# Patient Record
Sex: MALE | Race: WHITE | HISPANIC OR LATINO | Employment: UNEMPLOYED | URBAN - NONMETROPOLITAN AREA
[De-identification: names, ages, dates, MRNs, and addresses within clinical notes are randomized per-mention and may not be internally consistent; named-entity substitution may affect disease eponyms.]

---

## 2022-09-06 ENCOUNTER — TELEMEDICINE (OUTPATIENT)
Dept: MEDICAL GROUP | Facility: PHYSICIAN GROUP | Age: 67
End: 2022-09-06
Payer: COMMERCIAL

## 2022-09-06 VITALS
DIASTOLIC BLOOD PRESSURE: 74 MMHG | SYSTOLIC BLOOD PRESSURE: 117 MMHG | HEIGHT: 69 IN | TEMPERATURE: 97.5 F | WEIGHT: 145 LBS | BODY MASS INDEX: 21.48 KG/M2

## 2022-09-06 DIAGNOSIS — D45 POLYCYTHEMIA VERA (HCC): ICD-10-CM

## 2022-09-06 DIAGNOSIS — K82.4 GALLBLADDER POLYP: ICD-10-CM

## 2022-09-06 DIAGNOSIS — Z12.11 SCREENING FOR COLON CANCER: ICD-10-CM

## 2022-09-06 DIAGNOSIS — U07.1 COVID-19: ICD-10-CM

## 2022-09-06 PROCEDURE — 99204 OFFICE O/P NEW MOD 45 MIN: CPT | Mod: 95 | Performed by: PHYSICIAN ASSISTANT

## 2022-09-06 RX ORDER — CALCIUM POLYCARBOPHIL 625 MG
1250 TABLET ORAL
COMMUNITY

## 2022-09-06 RX ORDER — HYDROXYUREA 500 MG/1
CAPSULE ORAL
COMMUNITY
Start: 2022-07-15

## 2022-09-06 ASSESSMENT — PATIENT HEALTH QUESTIONNAIRE - PHQ9: CLINICAL INTERPRETATION OF PHQ2 SCORE: 0

## 2022-09-06 NOTE — PROGRESS NOTES
Telemedicine Visit: Established Patient     This encounter was conducted via Zoom using encrypted video.  Verbal consent was obtained. Patient's identity was verified. The patient was in private location in [place].     CC:  Chief Complaint   Patient presents with    Establish Care     POS for COVID       HISTORY OF PRESENT ILLNESS: Patient is a 67 y.o. male established patient presenting to establish PCP  Pt tested positive for COVID today. Has congestion, on/off low grade fever, coughing. Has a little SOB. Taking mucinex. Has albuterol inhaler too.   Pt has polycythemia vera. Sees Dr Horn at [facility]. Takes hydroxyurea for this. Abdominal US done on 7/18/22 indicating splenomegaly.   Pt gets pretty low BP and getting occasional dizziness.   Pt gets itchiness that has been present his whole life. Uses coal tar. No improvement with topical steroids.   Pt has gallbladder polyp at 9mm size. Recommended in US report to repeat imaging in 6 months.     No problem-specific Assessment & Plan notes found for this encounter.      There are no problems to display for this patient.     Allergies:Codeine, Hydrocodone-acetaminophen, and Vicodin [hydrocodone-acetaminophen]    Current Outpatient Medications   Medication Sig Dispense Refill    Aspirin 81 MG Cap       MULTIPLE VITAMIN PO Take  by mouth.      Calcium Polycarbophil (FIBER) 625 MG Tab Take 1,250 mg by mouth.      hydroxyurea (HYDREA) 500 MG Cap       Omega-3 Fatty Acids (FISH OIL) 1200 MG CAPSULE DELAYED RELEASE Take  by mouth.      Pseudoephedrine-DM-GG-APAP (COLD/COUGH MULTI-SYMPTOM PO) Take  by mouth.      Nirmatrelvir&Ritonavir 300/100 20 x 150 MG & 10 x 100MG Tablet Therapy Pack Take 300 mg nirmatrelvir (two 150 mg tablets) with 100 mg ritonavir (one 100 mg tablet) by mouth, with all three tablets taken together twice daily for 5 days. 30 Each 0     No current facility-administered medications for this visit.       Social History     Tobacco Use    Smoking status:  "Never    Smokeless tobacco: Never   Vaping Use    Vaping Use: Never used   Substance Use Topics    Alcohol use: Never    Drug use: Never     Social History     Social History Narrative    Not on file       Family History   Problem Relation Age of Onset    Breast Cancer Mother         ROS:     - Constitutional:  Negative for fever, chills, unexpected weight change, and fatigue/generalized weakness.    - HEENT:  Negative for headaches, vision changes, hearing changes, ear pain, ear discharge, rhinorrhea, sinus congestion, sore throat, and neck pain.      - Respiratory: Negative for cough, sputum production, chest congestion, dyspnea, wheezing, and crackles.      - Cardiovascular: Negative for chest pain, palpitations, orthopnea, and bilateral lower extremity edema.     - Gastrointestinal: Negative for heartburn, nausea, vomiting, abdominal pain, hematochezia, melena, diarrhea, constipation, and greasy/foul-smelling stools.     - Genitourinary: Negative for dysuria, polyuria, hematuria, pyuria, urinary urgency, and urinary incontinence.     - Musculoskeletal: Negative for myalgias, back pain, and joint pain.     - Skin:Positive for itchiness.  Negative for rash, cyanotic skin color change.     - Neurological:Positive for intermittent numbness.  Negative for dizziness, tingling, tremors, focal weakness and headaches.     - Endo/Heme/Allergies: Does not bruise/bleed easily.     - Psychiatric/Behavioral: Negative for depression, suicidal/homicidal ideation and memory loss.              Exam:    /74   Temp 36.4 °C (97.5 °F) (Temporal)   Ht 1.753 m (5' 9\")   Wt 65.8 kg (145 lb)  Body mass index is 21.41 kg/m².    General:  Well nourished, well developed male in NAD  Head is grossly normal.  Neck: Supple.   Pulmonary: No accessory muscle use  Skin: No apparent lesions  Neuro: Alert and oriented  Psych: normal mood and affect    Please note that this dictation was created using voice recognition software. I have made " every reasonable attempt to correct obvious errors, but I expect that there are errors of grammar and possibly content that I did not discover before finalizing the note.    LABS: 9/6/22: Results reviewed and discussed with the patient, questions answered.    Assessment/Plan:  1. COVID-19  Will treat with Paxlovid. Return to office PRN  - Nirmatrelvir&Ritonavir 300/100 20 x 150 MG & 10 x 100MG Tablet Therapy Pack; Take 300 mg nirmatrelvir (two 150 mg tablets) with 100 mg ritonavir (one 100 mg tablet) by mouth, with all three tablets taken together twice daily for 5 days.  Dispense: 30 Each; Refill: 0    2. Screening for colon cancer  Order placed.   - COLOGUARD (FIT DNA)    3. Polycythemia vera (HCC)  F/u with Dr Horn    4. Gallbladder polyp  Will get repeat US in 4-5 months based on 9mm size  - US-RUQ; Future

## 2022-10-25 ENCOUNTER — HOSPITAL ENCOUNTER (OUTPATIENT)
Facility: MEDICAL CENTER | Age: 67
End: 2022-10-25
Attending: INTERNAL MEDICINE
Payer: COMMERCIAL

## 2022-10-25 PROCEDURE — 80053 COMPREHEN METABOLIC PANEL: CPT

## 2022-10-26 LAB
ALBUMIN SERPL BCP-MCNC: 4.3 G/DL (ref 3.2–4.9)
ALBUMIN/GLOB SERPL: 2.3 G/DL
ALP SERPL-CCNC: 80 U/L (ref 30–99)
ALT SERPL-CCNC: 13 U/L (ref 2–50)
ANION GAP SERPL CALC-SCNC: 13 MMOL/L (ref 7–16)
AST SERPL-CCNC: 17 U/L (ref 12–45)
BILIRUB SERPL-MCNC: 0.7 MG/DL (ref 0.1–1.5)
BUN SERPL-MCNC: 16 MG/DL (ref 8–22)
CALCIUM SERPL-MCNC: 9.1 MG/DL (ref 8.5–10.5)
CHLORIDE SERPL-SCNC: 102 MMOL/L (ref 96–112)
CO2 SERPL-SCNC: 23 MMOL/L (ref 20–33)
CREAT SERPL-MCNC: 0.78 MG/DL (ref 0.5–1.4)
GFR SERPLBLD CREATININE-BSD FMLA CKD-EPI: 99 ML/MIN/1.73 M 2
GLOBULIN SER CALC-MCNC: 1.9 G/DL (ref 1.9–3.5)
GLUCOSE SERPL-MCNC: 94 MG/DL (ref 65–99)
POTASSIUM SERPL-SCNC: 4.3 MMOL/L (ref 3.6–5.5)
PROT SERPL-MCNC: 6.2 G/DL (ref 6–8.2)
SODIUM SERPL-SCNC: 138 MMOL/L (ref 135–145)

## 2023-04-04 ENCOUNTER — TELEMEDICINE (OUTPATIENT)
Dept: MEDICAL GROUP | Facility: PHYSICIAN GROUP | Age: 68
End: 2023-04-04
Payer: COMMERCIAL

## 2023-04-04 VITALS — WEIGHT: 145 LBS | BODY MASS INDEX: 21.48 KG/M2 | HEIGHT: 69 IN

## 2023-04-04 DIAGNOSIS — U07.1 COVID-19: ICD-10-CM

## 2023-04-04 PROCEDURE — 99213 OFFICE O/P EST LOW 20 MIN: CPT | Mod: 95 | Performed by: STUDENT IN AN ORGANIZED HEALTH CARE EDUCATION/TRAINING PROGRAM

## 2023-04-04 ASSESSMENT — PATIENT HEALTH QUESTIONNAIRE - PHQ9: CLINICAL INTERPRETATION OF PHQ2 SCORE: 0

## 2023-04-04 ASSESSMENT — FIBROSIS 4 INDEX: FIB4 SCORE: 0.52

## 2023-04-04 NOTE — ASSESSMENT & PLAN NOTE
3 days of symptoms  Discussed Paxlovid which patient has had before, advised the non-FDA approved medication under emergency use authorization.  Patient agrees and understands and would like to go forward with this treatment.    Advised OTC guidelines  Return to care if worsening or ER if severe    Renal panel 5 months ago within normal limits

## 2023-04-04 NOTE — PROGRESS NOTES
"Virtual Visit: Established Patient   This visit was conducted via Zoom using secure and encrypted videoconferencing technology.   The patient was in their home in the state of [place].    The patient's identity was confirmed and verbal consent was obtained for this virtual visit.    Subjective:   CC:   Chief Complaint   Patient presents with    Coronavirus Screening     POS COVID x2days     Jose Armando Ram is a 67 y.o. male presenting for evaluation and management of:    Problem   Covid-19    Symptoms started: 3 days ago    Having congestion, headaches, dizziness, chills but no fevers, fatigue.   Diarrhea x1, denies nausea or vomiting.     Denies shortness of breath          ROS   hpi    Current medicines (including changes today)  Current Outpatient Medications   Medication Sig Dispense Refill    Nirmatrelvir&Ritonavir 300/100 20 x 150 MG & 10 x 100MG Tablet Therapy Pack Take 300 mg nirmatrelvir (two 150 mg tablets) with 100 mg ritonavir (one 100 mg tablet) by mouth, with all three tablets taken together twice daily for 5 days. 30 Each 0    Aspirin 81 MG Cap       MULTIPLE VITAMIN PO Take  by mouth.      Calcium Polycarbophil (FIBER) 625 MG Tab Take 1,250 mg by mouth.      hydroxyurea (HYDREA) 500 MG Cap       Omega-3 Fatty Acids (FISH OIL) 1200 MG CAPSULE DELAYED RELEASE Take  by mouth.      Pseudoephedrine-DM-GG-APAP (COLD/COUGH MULTI-SYMPTOM PO) Take  by mouth.       No current facility-administered medications for this visit.       Patient Active Problem List    Diagnosis Date Noted    COVID-19 04/04/2023    Polycythemia vera (HCC) 09/06/2022    Gallbladder polyp 09/06/2022        Objective:   Ht 1.753 m (5' 9\")   Wt 65.8 kg (145 lb)   BMI 21.41 kg/m²     Physical Exam:  Constitutional: Alert, no distress, well-groomed.  Skin: No rashes in visible areas.  Eye: Round. Conjunctiva clear, lids normal. No icterus.   ENMT: Lips pink without lesions, good dentition, moist mucous membranes. Phonation " normal.  Neck: No masses, no thyromegaly. Moves freely without pain.  Respiratory: Unlabored respiratory effort, no cough or audible wheeze  Psych: Alert and oriented x3, normal affect and mood.     Assessment and Plan:   The following treatment plan was discussed:     Problem List Items Addressed This Visit       COVID-19     3 days of symptoms  Discussed Paxlovid which patient has had before, advised the non-FDA approved medication under emergency use authorization.  Patient agrees and understands and would like to go forward with this treatment.    Advised OTC guidelines  Return to care if worsening or ER if severe    Renal panel 5 months ago within normal limits           Relevant Medications    Nirmatrelvir&Ritonavir 300/100 20 x 150 MG & 10 x 100MG Tablet Therapy Pack        Follow-up: Return if symptoms worsen or fail to improve.

## 2023-05-22 ENCOUNTER — OFFICE VISIT (OUTPATIENT)
Dept: MEDICAL GROUP | Facility: PHYSICIAN GROUP | Age: 68
End: 2023-05-22
Payer: COMMERCIAL

## 2023-05-22 VITALS
OXYGEN SATURATION: 99 % | HEART RATE: 87 BPM | RESPIRATION RATE: 16 BRPM | TEMPERATURE: 98 F | WEIGHT: 157 LBS | SYSTOLIC BLOOD PRESSURE: 124 MMHG | DIASTOLIC BLOOD PRESSURE: 74 MMHG | BODY MASS INDEX: 23.25 KG/M2 | HEIGHT: 69 IN

## 2023-05-22 DIAGNOSIS — K82.4 GALLBLADDER POLYP: ICD-10-CM

## 2023-05-22 DIAGNOSIS — D45 POLYCYTHEMIA VERA (HCC): ICD-10-CM

## 2023-05-22 DIAGNOSIS — Z00.00 PHYSICAL EXAM, ANNUAL: ICD-10-CM

## 2023-05-22 PROCEDURE — 3074F SYST BP LT 130 MM HG: CPT | Performed by: PHYSICIAN ASSISTANT

## 2023-05-22 PROCEDURE — 99397 PER PM REEVAL EST PAT 65+ YR: CPT | Performed by: PHYSICIAN ASSISTANT

## 2023-05-22 PROCEDURE — 3078F DIAST BP <80 MM HG: CPT | Performed by: PHYSICIAN ASSISTANT

## 2023-05-22 ASSESSMENT — FIBROSIS 4 INDEX: FIB4 SCORE: 0.52

## 2023-05-22 NOTE — PROGRESS NOTES
CC:    Chief Complaint   Patient presents with    Annual Exam       HISTORY OF THE PRESENT ILLNESS:  67 y.o. male presenting for annual physical exam.   Pt continues to see Dr Horn for his P vera.   Pt has not had repeat US for gallbladder polyp.         No problem-specific Assessment & Plan notes found for this encounter.    Allergies: Codeine, Hydrocodone-acetaminophen, and Vicodin [hydrocodone-acetaminophen]    Current Outpatient Medications Ordered in Epic   Medication Sig Dispense Refill    Aspirin 81 MG Cap       MULTIPLE VITAMIN PO Take  by mouth.      Calcium Polycarbophil (FIBER) 625 MG Tab Take 1,250 mg by mouth.      hydroxyurea (HYDREA) 500 MG Cap       Omega-3 Fatty Acids (FISH OIL) 1200 MG CAPSULE DELAYED RELEASE Take  by mouth.       No current Epic-ordered facility-administered medications on file.       History reviewed. No pertinent past medical history.    Past Surgical History:   Procedure Laterality Date    HERNIA REPAIR         Social History     Tobacco Use    Smoking status: Never    Smokeless tobacco: Never   Vaping Use    Vaping Use: Never used   Substance Use Topics    Alcohol use: Not Currently     Comment: occ    Drug use: Never       Social History     Social History Narrative    Not on file       Family History   Problem Relation Age of Onset    Breast Cancer Mother     Cancer Father        ROS:     - Constitutional: Negative for fever, chills, unexpected weight change, and fatigue/generalized weakness.     - HEENT: Negative for headaches, vision changes, hearing changes, ear pain, ear discharge, rhinorrhea, sinus congestion, sore throat, and neck pain.      - Respiratory: Negative for cough, sputum production, chest congestion, dyspnea, wheezing, and crackles.      - Cardiovascular: Negative for chest pain, palpitations, orthopnea, and bilateral lower extremity edema.          Health Maintenance  Cholesterol Screening: Fasting lipid panel  Diabetes Screening: Fasting blood  "sugar  Exercise: Regular exercise.   Substance Abuse: None  Safe in relationship Yes     Cancer screening  Colorectal Cancer Screenin         Exam: /74 (BP Location: Left arm, Patient Position: Sitting, BP Cuff Size: Adult)   Pulse 87   Temp 36.7 °C (98 °F) (Temporal)   Resp 16   Ht 1.753 m (5' 9\")   Wt 71.2 kg (157 lb)   SpO2 99%  Body mass index is 23.18 kg/m².    General: Normal appearing. No distress.  HEENT: Normocephalic. Eyes conjunctiva clear lids without ptosis, pupils equal and reactive to light accommodation, ears normal shape and contour, canals are clear bilaterally, tympanic membranes are benign, nasal mucosa benign, oropharynx is without erythema, edema or exudates.   Neck: Supple without JVD or bruit. No thyromegaly. No cervical lymphadenopathy  Pulmonary: Clear to ausculation.  Normal effort. No rales, ronchi, or wheezing.  Cardiovascular: Regular rate and rhythm without murmur, gallops or rubs  Neurologic: Grossly nonfocal, gait normal, normal muscle tone  Skin: Warm and dry.  No obvious lesions.  Musculoskeletal: No extremity cyanosis, clubbing, or edema. No deformity  Psych: Normal mood and affect. Alert and oriented x3. Judgment and insight is normal.    Please note that this dictation was created using voice recognition software. I have made every reasonable attempt to correct obvious errors, but I expect that there are errors of grammar and possibly content that I did not discover before finalizing the note.      Assessment/Plan    1. Physical exam, annual  PE conducted.  - TSH WITH REFLEX TO FT4; Future  - Lipid Profile; Future  - HEMOGLOBIN A1C; Future  - Comp Metabolic Panel; Future  - CBC WITH DIFFERENTIAL; Future    2. Polycythemia vera (HCC)  F/u with oncology    3. Gallbladder polyp  Advised to have US done.    "

## 2023-07-21 ENCOUNTER — HOSPITAL ENCOUNTER (OUTPATIENT)
Dept: RADIOLOGY | Facility: MEDICAL CENTER | Age: 68
End: 2023-07-21
Payer: COMMERCIAL

## 2023-07-31 ENCOUNTER — HOSPITAL ENCOUNTER (OUTPATIENT)
Dept: RADIOLOGY | Facility: MEDICAL CENTER | Age: 68
End: 2023-07-31
Attending: INTERNAL MEDICINE
Payer: COMMERCIAL

## 2023-07-31 DIAGNOSIS — R92.30 BREAST DENSITY: ICD-10-CM

## 2023-07-31 PROCEDURE — 76642 ULTRASOUND BREAST LIMITED: CPT | Mod: LT

## 2023-07-31 PROCEDURE — G0279 TOMOSYNTHESIS, MAMMO: HCPCS

## 2024-04-23 ENCOUNTER — OFFICE VISIT (OUTPATIENT)
Dept: MEDICAL GROUP | Facility: PHYSICIAN GROUP | Age: 69
End: 2024-04-23
Payer: COMMERCIAL

## 2024-04-23 VITALS
OXYGEN SATURATION: 96 % | BODY MASS INDEX: 23.85 KG/M2 | TEMPERATURE: 98 F | HEART RATE: 87 BPM | SYSTOLIC BLOOD PRESSURE: 122 MMHG | RESPIRATION RATE: 12 BRPM | WEIGHT: 161 LBS | DIASTOLIC BLOOD PRESSURE: 56 MMHG | HEIGHT: 69 IN

## 2024-04-23 DIAGNOSIS — E16.2 HYPOGLYCEMIA: ICD-10-CM

## 2024-04-23 PROCEDURE — 99213 OFFICE O/P EST LOW 20 MIN: CPT | Performed by: PHYSICIAN ASSISTANT

## 2024-04-23 PROCEDURE — 3074F SYST BP LT 130 MM HG: CPT | Performed by: PHYSICIAN ASSISTANT

## 2024-04-23 PROCEDURE — 3078F DIAST BP <80 MM HG: CPT | Performed by: PHYSICIAN ASSISTANT

## 2024-04-23 ASSESSMENT — FIBROSIS 4 INDEX: FIB4 SCORE: 0.53

## 2024-04-23 NOTE — PROGRESS NOTES
"CC:  Chief Complaint   Patient presents with    Follow-Up     Low blood sugar        HISTORY OF PRESENT ILLNESS: Patient is a 68 y.o. male established patient presenting with issues below  Pt has lightheadedness, SOB, headaches, reduced vision and it resolves as soon as he eats. Has had it for several years. Never had workup. Feels like it is worsening. Pt seen at his oncologist's office yesterday and found to have a blood sugar level of 63 after eating a hard boiled egg, piece of toast and coffee with sugar in it.     Current Outpatient Medications   Medication Sig Dispense Refill    Aspirin 81 MG Cap       Calcium Polycarbophil (FIBER) 625 MG Tab Take 1,250 mg by mouth.      hydroxyurea (HYDREA) 500 MG Cap       Omega-3 Fatty Acids (FISH OIL) 1200 MG CAPSULE DELAYED RELEASE Take  by mouth.      MULTIPLE VITAMIN PO Take  by mouth. (Patient not taking: Reported on 4/23/2024)       No current facility-administered medications for this visit.        ROS:     ROS    Exam:    /56   Pulse 87   Temp 36.7 °C (98 °F) (Temporal)   Resp 12   Ht 1.753 m (5' 9\")   Wt 73 kg (161 lb)   SpO2 96%  Body mass index is 23.78 kg/m².    General:  Well nourished, well developed male in NAD  Head is grossly normal.  Neck: Supple.   Skin: Warm and dry. No obvious lesions  Neuro: Normal muscle tone. Gait normal. Alert and oriented.  Psych: Normal mood and affect      Please note that this dictation was created using voice recognition software. I have made every reasonable attempt to correct obvious errors, but I expect that there are errors of grammar and possibly content that I did not discover before finalizing the note.        Assessment/Plan:    1. Hypoglycemia  Will check labs and review at next visit  - Comp Metabolic Panel; Future  - INSULIN FASTING; Future  - C-PEPTIDE; Future  - BETA-HYDROXYBUTYRIC ACID; Future  - CBC WITH DIFFERENTIAL; Future           "

## 2024-08-03 ENCOUNTER — DOCUMENTATION (OUTPATIENT)
Dept: MEDICAL GROUP | Facility: MEDICAL CENTER | Age: 69
End: 2024-08-03
Payer: COMMERCIAL

## 2024-08-03 DIAGNOSIS — U07.1 COVID-19: ICD-10-CM

## 2024-08-03 NOTE — PROGRESS NOTES
After hours phone call  Call on 8/3/2024 at 2:34 PM from Jose Armando Ram [phone] (home)  who reports PCP: Jose Armando Fernandez P.A.-C..  Pt reports:   Day 1 of symptoms 8/1/24. Sore throat, now with chills, sinus congestion. Pt requesting paxlovid. He has taken in the past without any issues.   Pmh of polycythemia vera on hydroxyurea.   He has a pulse oximeter showing O2 at 93% on RA at rest. No history of heart or pulmonary issues. He is reporting slight shortness of breath but no chest pain  Plan:   I will provide paxlovid  He understands that this is not a medical evaluation but is only provision to reduce risk from COVID19. Should he experience worsening SOB, CP, fatigue, malaise, confusion, difficulty to arouse, O2 sats<90% then he needs to present to ER for further evaluation.     Suzy Eli M.D.

## 2025-02-11 ENCOUNTER — OFFICE VISIT (OUTPATIENT)
Dept: MEDICAL GROUP | Facility: PHYSICIAN GROUP | Age: 70
End: 2025-02-11
Payer: COMMERCIAL

## 2025-02-11 VITALS
HEART RATE: 87 BPM | TEMPERATURE: 98 F | RESPIRATION RATE: 16 BRPM | WEIGHT: 169 LBS | SYSTOLIC BLOOD PRESSURE: 126 MMHG | BODY MASS INDEX: 25.03 KG/M2 | HEIGHT: 69 IN | OXYGEN SATURATION: 99 % | DIASTOLIC BLOOD PRESSURE: 60 MMHG

## 2025-02-11 DIAGNOSIS — R06.02 SOB (SHORTNESS OF BREATH) ON EXERTION: ICD-10-CM

## 2025-02-11 DIAGNOSIS — R42 VERTIGO: ICD-10-CM

## 2025-02-11 PROCEDURE — 3078F DIAST BP <80 MM HG: CPT | Performed by: PHYSICIAN ASSISTANT

## 2025-02-11 PROCEDURE — 3074F SYST BP LT 130 MM HG: CPT | Performed by: PHYSICIAN ASSISTANT

## 2025-02-11 PROCEDURE — 99214 OFFICE O/P EST MOD 30 MIN: CPT | Performed by: PHYSICIAN ASSISTANT

## 2025-02-11 NOTE — PROGRESS NOTES
"CC:  Chief Complaint   Patient presents with    Dizziness     Sob, tachycardia        HISTORY OF PRESENT ILLNESS: Patient is a 69 y.o. male established patient presenting with issues below  Pt having intermittent dizziness for several years. Gets them typically rarely but daughter states that episodes are becoming more frequent. Dizziness can last for up to a day and provoked by head movements and standing up. Describes as vertigo. Takes meclizine with good effect. Has been working out lately at the gym and feels SOB while he is on stationary bike and rowing machine. Feels heart pounding when exercising. His HR typically around 90 when working out. No ear pain. No CP with exercising.     Current Outpatient Medications   Medication Sig Dispense Refill    Aspirin 81 MG Cap       Calcium Polycarbophil (FIBER) 625 MG Tab Take 1,250 mg by mouth.      hydroxyurea (HYDREA) 500 MG Cap       Omega-3 Fatty Acids (FISH OIL) 1200 MG CAPSULE DELAYED RELEASE Take  by mouth.      Nirmatrelvir&Ritonavir 300/100 20 x 150 MG & 10 x 100MG Tablet Therapy Pack Nirmatrelvir 300 mg with ritonavir 100 mg, administered together, twice daily for 5 days (Patient not taking: Reported on 2/11/2025) 30 Each 0    MULTIPLE VITAMIN PO Take  by mouth. (Patient not taking: Reported on 2/11/2025)       No current facility-administered medications for this visit.        ROS:     ROS    Exam:    /60   Pulse 87   Temp 36.7 °C (98 °F) (Temporal)   Resp 16   Ht 1.753 m (5' 9\")   Wt 76.7 kg (169 lb)   SpO2 99%  Body mass index is 24.96 kg/m².    General:  Well nourished, well developed male in NAD  Head is grossly normal. Ear canals clear, tympanic membranes normal.  Neck: Supple.   Pulmonary: Clear to auscultation. No ronchi, wheezing or rales  Cardiac: Regular rate and rhythm. No murmurs.  Skin: Warm and dry. No obvious lesions  Neuro: Normal muscle tone. Gait normal. Alert and oriented.  Psych: Normal mood and affect      Please note that " this dictation was created using voice recognition software. I have made every reasonable attempt to correct obvious errors, but I expect that there are errors of grammar and possibly content that I did not discover before finalizing the note.        Assessment/Plan:    1. Vertigo  ENT referral placed.   - Referral to ENT    2. SOB (shortness of breath) on exertion  Reassurance. Sounds like poor stamina. No concerns for cardiovascular etiology. Will get CXR. Notify me if worsening.   - DX-CHEST-2 VIEWS; Future

## 2025-02-13 NOTE — Clinical Note
REFERRAL APPROVAL NOTICE         Sent on February 13, 2025                   Jose Armando Ram  [address]  [facility] [address]                   Dear Mr. Ram,    After a careful review of the medical information and benefit coverage, [facility] has processed your referral. See below for additional details.    If applicable, you must be actively enrolled with your insurance for coverage of the authorized service. If you have any questions regarding your coverage, please contact your insurance directly.    REFERRAL INFORMATION   Referral #:  [id number]  Referred-To Service Location    Referred-By Provider:  Otolaryngology    Jose Armando Fernandez P.A.-C.   [facility]      [address]  [place] [place] [id number] [phone] [address] [facility] [address]  [phone]    Referral Start Date:  02/11/2025  Referral End Date:   02/11/2026             SCHEDULING  If you do not already have an appointment, please call [phone] to make an appointment.     MORE INFORMATION  If you do not already have a [facility] account, sign up at: [email or web address]  You can access your medical information, make appointments, see lab results, billing information, and more.  If you have questions regarding this referral, please contact  the [facility] Referrals department at:             [phone]. Monday - Friday 8:00AM - 5:00PM.     Sincerely,    [facility]